# Patient Record
(demographics unavailable — no encounter records)

---

## 2024-10-29 NOTE — REASON FOR VISIT
[Post Op] : post op visit [de-identified] : 10/9/24 [de-identified] : HSC/D&C with sono guidance [de-identified] : She reports that she has had a very smooth recovery with no issues. She denies abnl vaginal bleeding, pelvic or abdominal pain or urinary or bowel complaints. No longer requiring pain medication. Returning to usual activities and maintaining pelvic rest.

## 2024-10-29 NOTE — DISCUSSION/SUMMARY
[Firm] : soft [Tender] : nontender [Cervical Abnormality] : normal cervix [External Genitalia Abnormal] : normal external genitalia [Vaginal Exam Abnormal] : normal vaginal exam [Doing Well] : is doing well [Excellent Pain Control] : has excellent pain control [No Sign of Infection] : is showing no signs of infection [0] : 0 [de-identified] : normal [de-identified] : no restrictions [FreeTextEntry1] : Pathology: 1.  Endocervical curettings -   Benign endocervical mucosa   2.  Endometrial curettings -   Scant benign endocervical and squamous mucosa.  No endometrial tissue is present

## 2024-10-29 NOTE — ASSESSMENT
[FreeTextEntry1] : 61y/o s/p HSC/D&C with benign pathology, healing well.   PLAN: Final pathology results were reviewed in detail with the patient and she was given a copy for her records.  Instructions were reviewed.  She was advised that she should promptly rpeort any PMB in angelic future and follow up with Dr. Reed for routine gynecologic care.  All questions were answered to her apparent satisfaction.

## 2024-10-29 NOTE — LETTER BODY
[Dear  ___] : Dear  [unfilled], [I recently saw our patient [unfilled] for a follow-up visit.] : I recently saw our patient, [unfilled] for a follow-up visit. [Attached please find my note.] : Attached please find my note. [FreeTextEntry2] : The uterine cavity was successfully entered and visualized with sono guidance and there was no polyp nor tissue. She will return to you for ongoing GYN care. Thank you again for referring this [facility] patient.  [FreeTextEntry1] : pathology